# Patient Record
Sex: FEMALE
[De-identification: names, ages, dates, MRNs, and addresses within clinical notes are randomized per-mention and may not be internally consistent; named-entity substitution may affect disease eponyms.]

---

## 2018-04-20 ENCOUNTER — TRANSCRIPTION ENCOUNTER (OUTPATIENT)
Age: 38
End: 2018-04-20

## 2019-10-23 ENCOUNTER — TRANSCRIPTION ENCOUNTER (OUTPATIENT)
Age: 39
End: 2019-10-23

## 2019-10-30 ENCOUNTER — TRANSCRIPTION ENCOUNTER (OUTPATIENT)
Age: 39
End: 2019-10-30

## 2022-11-11 PROBLEM — Z00.00 ENCOUNTER FOR PREVENTIVE HEALTH EXAMINATION: Status: ACTIVE | Noted: 2022-11-11

## 2022-12-09 ENCOUNTER — APPOINTMENT (OUTPATIENT)
Dept: HEART AND VASCULAR | Facility: CLINIC | Age: 42
End: 2022-12-09

## 2022-12-09 ENCOUNTER — NON-APPOINTMENT (OUTPATIENT)
Age: 42
End: 2022-12-09

## 2022-12-09 VITALS
SYSTOLIC BLOOD PRESSURE: 139 MMHG | BODY MASS INDEX: 25.74 KG/M2 | WEIGHT: 164 LBS | OXYGEN SATURATION: 99 % | TEMPERATURE: 97.6 F | DIASTOLIC BLOOD PRESSURE: 90 MMHG | HEIGHT: 67 IN | HEART RATE: 78 BPM

## 2022-12-09 DIAGNOSIS — R03.0 ELEVATED BLOOD-PRESSURE READING, W/OUT DIAGNOSIS OF HYPERTENSION: ICD-10-CM

## 2022-12-09 DIAGNOSIS — E78.2 MIXED HYPERLIPIDEMIA: ICD-10-CM

## 2022-12-09 DIAGNOSIS — Z82.3 FAMILY HISTORY OF STROKE: ICD-10-CM

## 2022-12-09 PROCEDURE — 93000 ELECTROCARDIOGRAM COMPLETE: CPT

## 2022-12-09 PROCEDURE — 99204 OFFICE O/P NEW MOD 45 MIN: CPT

## 2022-12-09 RX ORDER — DESVENLAFAXINE 25 MG/1
25 TABLET, EXTENDED RELEASE ORAL
Qty: 30 | Refills: 0 | Status: ACTIVE | COMMUNITY
Start: 2022-11-02

## 2022-12-09 RX ORDER — DESVENLAFAXINE 100 MG/1
100 TABLET, EXTENDED RELEASE ORAL
Qty: 60 | Refills: 0 | Status: ACTIVE | COMMUNITY
Start: 2022-11-02

## 2022-12-09 RX ORDER — ETONOGESTREL AND ETHINYL ESTRADIOL 11.7; 2.7 MG/1; MG/1
0.12-0.015 INSERT, EXTENDED RELEASE VAGINAL
Qty: 1 | Refills: 0 | Status: ACTIVE | COMMUNITY
Start: 2022-08-24

## 2022-12-09 RX ORDER — ALPRAZOLAM 0.25 MG/1
0.25 TABLET ORAL
Qty: 30 | Refills: 0 | Status: ACTIVE | COMMUNITY
Start: 2022-11-02

## 2022-12-09 NOTE — REASON FOR VISIT
[Hyperlipidemia] : hyperlipidemia [CV Risk Factors and Non-Cardiac Disease] : CV risk factors and non-cardiac disease [FreeTextEntry3] : PCP/OBGYN - Dr. Zeferino Cole\par PCP/OBGYN - Dr. Zeferino Cole\par PCP/OBGYN - Dr. Zeferino Cole\par PCP/OBGYN - Dr. Zeferino Cole\par PCP/OBGYN - Dr. Zeferino Cole\par PCP/OBGYN - Dr. Zeferino Cole\par PCP/OBGYN - Dr. Zeferino Cole\par PCP/OBGYN - Dr. Zeferino Cole\par PCP/OBGYN Dr. Pastora Cole [FreeTextEntry1] : \par

## 2022-12-09 NOTE — HISTORY OF PRESENT ILLNESS
[FreeTextEntry1] : 42 year old female with hx of low ferritin and anxiety who presents for hyperlipidemia at the request of her pcp.\par \par Lipid panel (shown on phone, FASTING): 09/2022 , Tchol 279, HDL 85, \par \par Today she denies any lightheadedness, fatigue, shortness of breath, dyspnea on exertion, chest pain or palpitations, abdominal pain or GI upset, lower extremity edema, claudication, or PND/orthopnea.\par \par CARDIOMETABOLIC RISK FACTORS:\par Family History: father with CVA at 58 (healthy)\par \par Lifestyle History: \par   -Smoking: NEVER\par   -EtOH: YES, 1-7 drinks per week\par   -Exercise: YES, vigorous intensity for 60-120min per week, mod & light intensity for >160min/wk each\par   -Stress: NO\par   -Sleep apnea: NO\par   -Mediterranean Diet Score (9 question survey) was 7 (near-optimal) \par   -Self-described diet: coffee, oatmeal or yogurt for breakfast, soup/salad for lunch\par \par Do you have polycystic ovarian syndrome? NO\par Have you ever been pregnant? If so, how many times? YES, 2\par Did you have any complications during or after pregnancy? NO\par Have you gone through menopause? If yes, at what age? NO

## 2022-12-09 NOTE — DISCUSSION/SUMMARY
[___ Month(s)] : in [unfilled] month(s) [FreeTextEntry1] : 42 year old female with hx of low ferritin and anxiety who presents for hyperlipidemia at the request of her pcp.\par \par #mixed dyslipidemia\par #primary prevention\par -Lipid panel (FASTING): 09/2022 , Tchol 279, HDL 85, \par -pt with hyperlipidemia and hypertriglyceridemia, cardiometabolic risk factors already close to optimized. father had a CVA at 58, however he was in good shape at the time.\par -Lifestyle can be better optimized, provided information on diet modifications and increased physical activity.\par Plan:\par   -pt to focus on lifestyle factors\par   -will recheck lipids in 4mo\par   -will obtain calcium score for risk stratification\par   -encouraged patient to continue healthy exercise and eating habits, focusing on a Mediterranean style of eating and aiming for the recommended 150 minutes per week of moderate physical activity\par \par #Elevated blood pressure reading\par BP mildly elevated in office, no hx of hypertension\par -will continue to monitor at next visit

## 2023-01-11 ENCOUNTER — OUTPATIENT (OUTPATIENT)
Dept: OUTPATIENT SERVICES | Facility: HOSPITAL | Age: 43
LOS: 1 days | End: 2023-01-11

## 2023-01-11 ENCOUNTER — APPOINTMENT (OUTPATIENT)
Dept: CT IMAGING | Facility: CLINIC | Age: 43
End: 2023-01-11
Payer: SELF-PAY

## 2023-01-11 PROCEDURE — 75571 CT HRT W/O DYE W/CA TEST: CPT | Mod: 26

## 2023-04-10 ENCOUNTER — APPOINTMENT (OUTPATIENT)
Dept: HEART AND VASCULAR | Facility: CLINIC | Age: 43
End: 2023-04-10

## 2023-08-29 ENCOUNTER — APPOINTMENT (OUTPATIENT)
Dept: MAMMOGRAPHY | Facility: CLINIC | Age: 43
End: 2023-08-29

## 2023-10-09 ENCOUNTER — APPOINTMENT (OUTPATIENT)
Dept: HEART AND VASCULAR | Facility: CLINIC | Age: 43
End: 2023-10-09